# Patient Record
Sex: MALE | HISPANIC OR LATINO | Employment: UNEMPLOYED | URBAN - METROPOLITAN AREA
[De-identification: names, ages, dates, MRNs, and addresses within clinical notes are randomized per-mention and may not be internally consistent; named-entity substitution may affect disease eponyms.]

---

## 2020-05-04 ENCOUNTER — APPOINTMENT (OUTPATIENT)
Dept: GENERAL RADIOLOGY | Facility: HOSPITAL | Age: 65
End: 2020-05-04

## 2020-05-04 ENCOUNTER — HOSPITAL ENCOUNTER (INPATIENT)
Facility: HOSPITAL | Age: 65
LOS: 2 days | Discharge: HOME | End: 2020-05-06
Attending: EMERGENCY MEDICINE | Admitting: INTERNAL MEDICINE

## 2020-05-04 DIAGNOSIS — I20.0 UNSTABLE ANGINA (HCC): Primary | ICD-10-CM

## 2020-05-04 DIAGNOSIS — I24.9 ACUTE CORONARY SYNDROME (HCC): ICD-10-CM

## 2020-05-04 LAB
ALBUMIN SERPL-MCNC: 3.9 G/DL (ref 3.5–5.2)
ALBUMIN/GLOB SERPL: 1.1 G/DL
ALP SERPL-CCNC: 84 U/L (ref 39–117)
ALT SERPL W P-5'-P-CCNC: 28 U/L (ref 1–41)
ANION GAP SERPL CALCULATED.3IONS-SCNC: 13.6 MMOL/L (ref 5–15)
APTT PPP: 28 SECONDS (ref 22.7–35.4)
AST SERPL-CCNC: 29 U/L (ref 1–40)
BASOPHILS # BLD AUTO: 0.04 10*3/MM3 (ref 0–0.2)
BASOPHILS NFR BLD AUTO: 0.5 % (ref 0–1.5)
BILIRUB SERPL-MCNC: 0.3 MG/DL (ref 0.2–1.2)
BUN BLD-MCNC: 15 MG/DL (ref 8–23)
BUN/CREAT SERPL: 16.7 (ref 7–25)
CALCIUM SPEC-SCNC: 8.7 MG/DL (ref 8.6–10.5)
CHLORIDE SERPL-SCNC: 105 MMOL/L (ref 98–107)
CO2 SERPL-SCNC: 21.4 MMOL/L (ref 22–29)
CREAT BLD-MCNC: 0.9 MG/DL (ref 0.76–1.27)
DEPRECATED RDW RBC AUTO: 47.6 FL (ref 37–54)
EOSINOPHIL # BLD AUTO: 0.25 10*3/MM3 (ref 0–0.4)
EOSINOPHIL NFR BLD AUTO: 3.3 % (ref 0.3–6.2)
ERYTHROCYTE [DISTWIDTH] IN BLOOD BY AUTOMATED COUNT: 13.8 % (ref 12.3–15.4)
GFR SERPL CREATININE-BSD FRML MDRD: 103 ML/MIN/1.73
GFR SERPL CREATININE-BSD FRML MDRD: 85 ML/MIN/1.73
GLOBULIN UR ELPH-MCNC: 3.6 GM/DL
GLUCOSE BLD-MCNC: 152 MG/DL (ref 65–99)
HCT VFR BLD AUTO: 42.5 % (ref 37.5–51)
HGB BLD-MCNC: 14.2 G/DL (ref 13–17.7)
IMM GRANULOCYTES # BLD AUTO: 0.02 10*3/MM3 (ref 0–0.05)
IMM GRANULOCYTES NFR BLD AUTO: 0.3 % (ref 0–0.5)
INR PPP: 0.97 (ref 0.9–1.1)
LYMPHOCYTES # BLD AUTO: 2.25 10*3/MM3 (ref 0.7–3.1)
LYMPHOCYTES NFR BLD AUTO: 29.8 % (ref 19.6–45.3)
MCH RBC QN AUTO: 31.1 PG (ref 26.6–33)
MCHC RBC AUTO-ENTMCNC: 33.4 G/DL (ref 31.5–35.7)
MCV RBC AUTO: 93 FL (ref 79–97)
MONOCYTES # BLD AUTO: 0.65 10*3/MM3 (ref 0.1–0.9)
MONOCYTES NFR BLD AUTO: 8.6 % (ref 5–12)
NEUTROPHILS # BLD AUTO: 4.35 10*3/MM3 (ref 1.7–7)
NEUTROPHILS NFR BLD AUTO: 57.5 % (ref 42.7–76)
NRBC BLD AUTO-RTO: 0 /100 WBC (ref 0–0.2)
PLATELET # BLD AUTO: 331 10*3/MM3 (ref 140–450)
PMV BLD AUTO: 9 FL (ref 6–12)
POTASSIUM BLD-SCNC: 3.3 MMOL/L (ref 3.5–5.2)
PROT SERPL-MCNC: 7.5 G/DL (ref 6–8.5)
PROTHROMBIN TIME: 12.6 SECONDS (ref 11.7–14.2)
RBC # BLD AUTO: 4.57 10*6/MM3 (ref 4.14–5.8)
SODIUM BLD-SCNC: 140 MMOL/L (ref 136–145)
TROPONIN T SERPL-MCNC: <0.01 NG/ML (ref 0–0.03)
WBC NRBC COR # BLD: 7.56 10*3/MM3 (ref 3.4–10.8)

## 2020-05-04 PROCEDURE — 99285 EMERGENCY DEPT VISIT HI MDM: CPT

## 2020-05-04 PROCEDURE — 93005 ELECTROCARDIOGRAM TRACING: CPT

## 2020-05-04 PROCEDURE — B2111ZZ FLUOROSCOPY OF MULTIPLE CORONARY ARTERIES USING LOW OSMOLAR CONTRAST: ICD-10-PCS | Performed by: INTERNAL MEDICINE

## 2020-05-04 PROCEDURE — 027034Z DILATION OF CORONARY ARTERY, ONE ARTERY WITH DRUG-ELUTING INTRALUMINAL DEVICE, PERCUTANEOUS APPROACH: ICD-10-PCS | Performed by: INTERNAL MEDICINE

## 2020-05-04 PROCEDURE — 85610 PROTHROMBIN TIME: CPT | Performed by: EMERGENCY MEDICINE

## 2020-05-04 PROCEDURE — 4A023N7 MEASUREMENT OF CARDIAC SAMPLING AND PRESSURE, LEFT HEART, PERCUTANEOUS APPROACH: ICD-10-PCS | Performed by: INTERNAL MEDICINE

## 2020-05-04 PROCEDURE — 99222 1ST HOSP IP/OBS MODERATE 55: CPT | Performed by: INTERNAL MEDICINE

## 2020-05-04 PROCEDURE — 84484 ASSAY OF TROPONIN QUANT: CPT | Performed by: EMERGENCY MEDICINE

## 2020-05-04 PROCEDURE — 85025 COMPLETE CBC W/AUTO DIFF WBC: CPT | Performed by: EMERGENCY MEDICINE

## 2020-05-04 PROCEDURE — 80053 COMPREHEN METABOLIC PANEL: CPT | Performed by: EMERGENCY MEDICINE

## 2020-05-04 PROCEDURE — B2151ZZ FLUOROSCOPY OF LEFT HEART USING LOW OSMOLAR CONTRAST: ICD-10-PCS | Performed by: INTERNAL MEDICINE

## 2020-05-04 PROCEDURE — 25010000002 HEPARIN (PORCINE) PER 1000 UNITS: Performed by: EMERGENCY MEDICINE

## 2020-05-04 PROCEDURE — 93005 ELECTROCARDIOGRAM TRACING: CPT | Performed by: EMERGENCY MEDICINE

## 2020-05-04 PROCEDURE — 93010 ELECTROCARDIOGRAM REPORT: CPT | Performed by: INTERNAL MEDICINE

## 2020-05-04 PROCEDURE — 85730 THROMBOPLASTIN TIME PARTIAL: CPT | Performed by: EMERGENCY MEDICINE

## 2020-05-04 PROCEDURE — 71045 X-RAY EXAM CHEST 1 VIEW: CPT

## 2020-05-04 RX ORDER — ASPIRIN 81 MG/1
81 TABLET, CHEWABLE ORAL DAILY
COMMUNITY

## 2020-05-04 RX ORDER — NITROGLYCERIN 20 MG/100ML
5-200 INJECTION INTRAVENOUS
Status: DISCONTINUED | OUTPATIENT
Start: 2020-05-04 | End: 2020-05-05

## 2020-05-04 RX ORDER — HEPARIN SODIUM 5000 [USP'U]/ML
41.8 INJECTION, SOLUTION INTRAVENOUS; SUBCUTANEOUS ONCE
Status: COMPLETED | OUTPATIENT
Start: 2020-05-04 | End: 2020-05-04

## 2020-05-04 RX ORDER — SODIUM CHLORIDE 0.9 % (FLUSH) 0.9 %
10 SYRINGE (ML) INJECTION AS NEEDED
Status: DISCONTINUED | OUTPATIENT
Start: 2020-05-04 | End: 2020-05-06 | Stop reason: HOSPADM

## 2020-05-04 RX ORDER — HEPARIN SODIUM 10000 [USP'U]/100ML
10.4 INJECTION, SOLUTION INTRAVENOUS
Status: DISCONTINUED | OUTPATIENT
Start: 2020-05-04 | End: 2020-05-05

## 2020-05-04 RX ORDER — HEPARIN SODIUM 5000 [USP'U]/ML
30-41.8 INJECTION, SOLUTION INTRAVENOUS; SUBCUTANEOUS EVERY 6 HOURS PRN
Status: DISCONTINUED | OUTPATIENT
Start: 2020-05-04 | End: 2020-05-05

## 2020-05-04 RX ADMIN — NITROGLYCERIN 10 MCG/MIN: 20 INJECTION INTRAVENOUS at 22:26

## 2020-05-04 RX ADMIN — HEPARIN SODIUM 10.4 UNITS/KG/HR: 10000 INJECTION, SOLUTION INTRAVENOUS at 22:31

## 2020-05-04 RX ADMIN — HEPARIN SODIUM 4000 UNITS: 5000 INJECTION INTRAVENOUS; SUBCUTANEOUS at 22:30

## 2020-05-04 RX ADMIN — METOPROLOL TARTRATE 5 MG: 5 INJECTION INTRAVENOUS at 22:26

## 2020-05-05 LAB
ACT BLD: 191 SECONDS (ref 82–152)
ACT BLD: 252 SECONDS (ref 82–152)
ACT BLD: 301 SECONDS (ref 82–152)
ANION GAP SERPL CALCULATED.3IONS-SCNC: 11.3 MMOL/L (ref 5–15)
APTT PPP: 32.9 SECONDS (ref 22.7–35.4)
BUN BLD-MCNC: 12 MG/DL (ref 8–23)
BUN/CREAT SERPL: 18.2 (ref 7–25)
CALCIUM SPEC-SCNC: 8.5 MG/DL (ref 8.6–10.5)
CHLORIDE SERPL-SCNC: 105 MMOL/L (ref 98–107)
CHOLEST SERPL-MCNC: 170 MG/DL (ref 0–200)
CO2 SERPL-SCNC: 21.7 MMOL/L (ref 22–29)
CREAT BLD-MCNC: 0.66 MG/DL (ref 0.76–1.27)
GFR SERPL CREATININE-BSD FRML MDRD: 121 ML/MIN/1.73
GFR SERPL CREATININE-BSD FRML MDRD: 147 ML/MIN/1.73
GLUCOSE BLD-MCNC: 133 MG/DL (ref 65–99)
GLUCOSE BLDC GLUCOMTR-MCNC: 140 MG/DL (ref 70–130)
GLUCOSE BLDC GLUCOMTR-MCNC: 89 MG/DL (ref 70–130)
HBA1C MFR BLD: 5.9 % (ref 4.8–5.6)
HDLC SERPL-MCNC: 38 MG/DL (ref 40–60)
LDLC SERPL CALC-MCNC: 110 MG/DL (ref 0–100)
LDLC/HDLC SERPL: 2.89 {RATIO}
MAGNESIUM SERPL-MCNC: 2.1 MG/DL (ref 1.6–2.4)
POTASSIUM BLD-SCNC: 3.6 MMOL/L (ref 3.5–5.2)
SODIUM BLD-SCNC: 138 MMOL/L (ref 136–145)
TRIGL SERPL-MCNC: 111 MG/DL (ref 0–150)
TROPONIN T SERPL-MCNC: 0.69 NG/ML (ref 0–0.03)
VLDLC SERPL-MCNC: 22.2 MG/DL (ref 5–40)

## 2020-05-05 PROCEDURE — 0 IOPAMIDOL PER 1 ML: Performed by: INTERNAL MEDICINE

## 2020-05-05 PROCEDURE — C1725 CATH, TRANSLUMIN NON-LASER: HCPCS | Performed by: INTERNAL MEDICINE

## 2020-05-05 PROCEDURE — 25010000002 FENTANYL CITRATE (PF) 100 MCG/2ML SOLUTION: Performed by: INTERNAL MEDICINE

## 2020-05-05 PROCEDURE — 99153 MOD SED SAME PHYS/QHP EA: CPT | Performed by: INTERNAL MEDICINE

## 2020-05-05 PROCEDURE — 99232 SBSQ HOSP IP/OBS MODERATE 35: CPT | Performed by: INTERNAL MEDICINE

## 2020-05-05 PROCEDURE — 83735 ASSAY OF MAGNESIUM: CPT | Performed by: INTERNAL MEDICINE

## 2020-05-05 PROCEDURE — 93458 L HRT ARTERY/VENTRICLE ANGIO: CPT | Performed by: INTERNAL MEDICINE

## 2020-05-05 PROCEDURE — C1769 GUIDE WIRE: HCPCS | Performed by: INTERNAL MEDICINE

## 2020-05-05 PROCEDURE — 25010000002 HEPARIN (PORCINE) PER 1000 UNITS: Performed by: INTERNAL MEDICINE

## 2020-05-05 PROCEDURE — 36415 COLL VENOUS BLD VENIPUNCTURE: CPT | Performed by: INTERNAL MEDICINE

## 2020-05-05 PROCEDURE — 85347 COAGULATION TIME ACTIVATED: CPT

## 2020-05-05 PROCEDURE — 25010000002 MIDAZOLAM PER 1 MG: Performed by: INTERNAL MEDICINE

## 2020-05-05 PROCEDURE — C1894 INTRO/SHEATH, NON-LASER: HCPCS | Performed by: INTERNAL MEDICINE

## 2020-05-05 PROCEDURE — 93010 ELECTROCARDIOGRAM REPORT: CPT | Performed by: INTERNAL MEDICINE

## 2020-05-05 PROCEDURE — 82962 GLUCOSE BLOOD TEST: CPT

## 2020-05-05 PROCEDURE — 84484 ASSAY OF TROPONIN QUANT: CPT | Performed by: INTERNAL MEDICINE

## 2020-05-05 PROCEDURE — 80048 BASIC METABOLIC PNL TOTAL CA: CPT | Performed by: INTERNAL MEDICINE

## 2020-05-05 PROCEDURE — 93005 ELECTROCARDIOGRAM TRACING: CPT | Performed by: INTERNAL MEDICINE

## 2020-05-05 PROCEDURE — 99152 MOD SED SAME PHYS/QHP 5/>YRS: CPT | Performed by: INTERNAL MEDICINE

## 2020-05-05 PROCEDURE — C1874 STENT, COATED/COV W/DEL SYS: HCPCS | Performed by: INTERNAL MEDICINE

## 2020-05-05 PROCEDURE — 85730 THROMBOPLASTIN TIME PARTIAL: CPT | Performed by: INTERNAL MEDICINE

## 2020-05-05 PROCEDURE — 92928 PRQ TCAT PLMT NTRAC ST 1 LES: CPT | Performed by: INTERNAL MEDICINE

## 2020-05-05 PROCEDURE — C9600 PERC DRUG-EL COR STENT SING: HCPCS | Performed by: INTERNAL MEDICINE

## 2020-05-05 PROCEDURE — 80061 LIPID PANEL: CPT | Performed by: INTERNAL MEDICINE

## 2020-05-05 PROCEDURE — 83036 HEMOGLOBIN GLYCOSYLATED A1C: CPT | Performed by: INTERNAL MEDICINE

## 2020-05-05 PROCEDURE — C1887 CATHETER, GUIDING: HCPCS | Performed by: INTERNAL MEDICINE

## 2020-05-05 DEVICE — XIENCE SIERRA™ EVEROLIMUS ELUTING CORONARY STENT SYSTEM 3.50 MM X 18 MM / RAPID-EXCHANGE
Type: IMPLANTABLE DEVICE | Status: FUNCTIONAL
Brand: XIENCE SIERRA™

## 2020-05-05 RX ORDER — AMLODIPINE BESYLATE 5 MG/1
5 TABLET ORAL ONCE
Status: COMPLETED | OUTPATIENT
Start: 2020-05-05 | End: 2020-05-05

## 2020-05-05 RX ORDER — HYDROCODONE BITARTRATE AND ACETAMINOPHEN 5; 325 MG/1; MG/1
1 TABLET ORAL EVERY 4 HOURS PRN
Status: DISCONTINUED | OUTPATIENT
Start: 2020-05-05 | End: 2020-05-06 | Stop reason: HOSPADM

## 2020-05-05 RX ORDER — LIDOCAINE HYDROCHLORIDE 20 MG/ML
INJECTION, SOLUTION INFILTRATION; PERINEURAL AS NEEDED
Status: DISCONTINUED | OUTPATIENT
Start: 2020-05-05 | End: 2020-05-05 | Stop reason: HOSPADM

## 2020-05-05 RX ORDER — SODIUM CHLORIDE 9 MG/ML
100 INJECTION, SOLUTION INTRAVENOUS CONTINUOUS
Status: DISCONTINUED | OUTPATIENT
Start: 2020-05-05 | End: 2020-05-05

## 2020-05-05 RX ORDER — POTASSIUM CHLORIDE 1.5 G/1.77G
40 POWDER, FOR SOLUTION ORAL AS NEEDED
Status: DISCONTINUED | OUTPATIENT
Start: 2020-05-05 | End: 2020-05-06 | Stop reason: HOSPADM

## 2020-05-05 RX ORDER — CARVEDILOL 3.12 MG/1
3.12 TABLET ORAL ONCE
Status: COMPLETED | OUTPATIENT
Start: 2020-05-05 | End: 2020-05-05

## 2020-05-05 RX ORDER — NALOXONE HCL 0.4 MG/ML
0.4 VIAL (ML) INJECTION
Status: DISCONTINUED | OUTPATIENT
Start: 2020-05-05 | End: 2020-05-06 | Stop reason: HOSPADM

## 2020-05-05 RX ORDER — LISINOPRIL 20 MG/1
20 TABLET ORAL ONCE
Status: COMPLETED | OUTPATIENT
Start: 2020-05-05 | End: 2020-05-05

## 2020-05-05 RX ORDER — SODIUM CHLORIDE 0.9 % (FLUSH) 0.9 %
10 SYRINGE (ML) INJECTION AS NEEDED
Status: DISCONTINUED | OUTPATIENT
Start: 2020-05-05 | End: 2020-05-06 | Stop reason: HOSPADM

## 2020-05-05 RX ORDER — SODIUM CHLORIDE 0.9 % (FLUSH) 0.9 %
10 SYRINGE (ML) INJECTION EVERY 12 HOURS SCHEDULED
Status: DISCONTINUED | OUTPATIENT
Start: 2020-05-05 | End: 2020-05-06 | Stop reason: HOSPADM

## 2020-05-05 RX ORDER — HYDRALAZINE HYDROCHLORIDE 20 MG/ML
10 INJECTION INTRAMUSCULAR; INTRAVENOUS EVERY 4 HOURS PRN
Status: DISCONTINUED | OUTPATIENT
Start: 2020-05-05 | End: 2020-05-06 | Stop reason: HOSPADM

## 2020-05-05 RX ORDER — CARVEDILOL 6.25 MG/1
6.25 TABLET ORAL 2 TIMES DAILY WITH MEALS
Status: DISCONTINUED | OUTPATIENT
Start: 2020-05-05 | End: 2020-05-06

## 2020-05-05 RX ORDER — HEPARIN SODIUM 1000 [USP'U]/ML
INJECTION, SOLUTION INTRAVENOUS; SUBCUTANEOUS AS NEEDED
Status: DISCONTINUED | OUTPATIENT
Start: 2020-05-05 | End: 2020-05-05 | Stop reason: HOSPADM

## 2020-05-05 RX ORDER — CARVEDILOL 3.12 MG/1
3.12 TABLET ORAL 2 TIMES DAILY WITH MEALS
Status: DISCONTINUED | OUTPATIENT
Start: 2020-05-05 | End: 2020-05-05

## 2020-05-05 RX ORDER — ATORVASTATIN CALCIUM 80 MG/1
80 TABLET, FILM COATED ORAL NIGHTLY
Status: DISCONTINUED | OUTPATIENT
Start: 2020-05-05 | End: 2020-05-06 | Stop reason: HOSPADM

## 2020-05-05 RX ORDER — ONDANSETRON 2 MG/ML
4 INJECTION INTRAMUSCULAR; INTRAVENOUS EVERY 6 HOURS PRN
Status: DISCONTINUED | OUTPATIENT
Start: 2020-05-05 | End: 2020-05-06 | Stop reason: HOSPADM

## 2020-05-05 RX ORDER — SODIUM CHLORIDE 9 MG/ML
100 INJECTION, SOLUTION INTRAVENOUS CONTINUOUS
Status: ACTIVE | OUTPATIENT
Start: 2020-05-05 | End: 2020-05-05

## 2020-05-05 RX ORDER — FENTANYL CITRATE 50 UG/ML
INJECTION, SOLUTION INTRAMUSCULAR; INTRAVENOUS AS NEEDED
Status: DISCONTINUED | OUTPATIENT
Start: 2020-05-05 | End: 2020-05-05 | Stop reason: HOSPADM

## 2020-05-05 RX ORDER — MORPHINE SULFATE 2 MG/ML
2 INJECTION, SOLUTION INTRAMUSCULAR; INTRAVENOUS EVERY 4 HOURS PRN
Status: DISCONTINUED | OUTPATIENT
Start: 2020-05-05 | End: 2020-05-06 | Stop reason: HOSPADM

## 2020-05-05 RX ORDER — LISINOPRIL 5 MG/1
5 TABLET ORAL
Status: DISCONTINUED | OUTPATIENT
Start: 2020-05-05 | End: 2020-05-06

## 2020-05-05 RX ORDER — CLOPIDOGREL BISULFATE 75 MG/1
75 TABLET ORAL DAILY
Status: DISCONTINUED | OUTPATIENT
Start: 2020-05-06 | End: 2020-05-06 | Stop reason: HOSPADM

## 2020-05-05 RX ORDER — POTASSIUM CHLORIDE 7.45 MG/ML
10 INJECTION INTRAVENOUS
Status: DISCONTINUED | OUTPATIENT
Start: 2020-05-05 | End: 2020-05-06 | Stop reason: HOSPADM

## 2020-05-05 RX ORDER — ASPIRIN 81 MG/1
81 TABLET, CHEWABLE ORAL DAILY
Status: DISCONTINUED | OUTPATIENT
Start: 2020-05-05 | End: 2020-05-06 | Stop reason: HOSPADM

## 2020-05-05 RX ORDER — POTASSIUM CHLORIDE 750 MG/1
40 CAPSULE, EXTENDED RELEASE ORAL AS NEEDED
Status: DISCONTINUED | OUTPATIENT
Start: 2020-05-05 | End: 2020-05-06 | Stop reason: HOSPADM

## 2020-05-05 RX ORDER — CARVEDILOL 6.25 MG/1
6.25 TABLET ORAL 2 TIMES DAILY WITH MEALS
Status: DISCONTINUED | OUTPATIENT
Start: 2020-05-05 | End: 2020-05-05

## 2020-05-05 RX ORDER — ACETAMINOPHEN 325 MG/1
650 TABLET ORAL EVERY 4 HOURS PRN
Status: DISCONTINUED | OUTPATIENT
Start: 2020-05-05 | End: 2020-05-06 | Stop reason: HOSPADM

## 2020-05-05 RX ORDER — MIDAZOLAM HYDROCHLORIDE 1 MG/ML
INJECTION INTRAMUSCULAR; INTRAVENOUS AS NEEDED
Status: DISCONTINUED | OUTPATIENT
Start: 2020-05-05 | End: 2020-05-05 | Stop reason: HOSPADM

## 2020-05-05 RX ORDER — ONDANSETRON 4 MG/1
4 TABLET, FILM COATED ORAL EVERY 6 HOURS PRN
Status: DISCONTINUED | OUTPATIENT
Start: 2020-05-05 | End: 2020-05-06 | Stop reason: HOSPADM

## 2020-05-05 RX ADMIN — LISINOPRIL 20 MG: 20 TABLET ORAL at 21:53

## 2020-05-05 RX ADMIN — POTASSIUM CHLORIDE 40 MEQ: 10 CAPSULE, COATED, EXTENDED RELEASE ORAL at 12:22

## 2020-05-05 RX ADMIN — AMLODIPINE BESYLATE 5 MG: 5 TABLET ORAL at 21:53

## 2020-05-05 RX ADMIN — CARVEDILOL 3.12 MG: 6.25 TABLET, FILM COATED ORAL at 12:20

## 2020-05-05 RX ADMIN — SODIUM CHLORIDE, PRESERVATIVE FREE 10 ML: 5 INJECTION INTRAVENOUS at 21:54

## 2020-05-05 RX ADMIN — POTASSIUM CHLORIDE 40 MEQ: 1.5 POWDER, FOR SOLUTION ORAL at 01:56

## 2020-05-05 RX ADMIN — SODIUM CHLORIDE, PRESERVATIVE FREE 10 ML: 5 INJECTION INTRAVENOUS at 01:56

## 2020-05-05 RX ADMIN — HEPARIN SODIUM 4000 UNITS: 5000 INJECTION INTRAVENOUS; SUBCUTANEOUS at 08:11

## 2020-05-05 RX ADMIN — SODIUM CHLORIDE 100 ML/HR: 9 INJECTION, SOLUTION INTRAVENOUS at 08:05

## 2020-05-05 RX ADMIN — HYDROCODONE BITARTRATE AND ACETAMINOPHEN 1 TABLET: 5; 325 TABLET ORAL at 01:44

## 2020-05-05 RX ADMIN — CARVEDILOL 6.25 MG: 6.25 TABLET, FILM COATED ORAL at 01:44

## 2020-05-05 RX ADMIN — SODIUM CHLORIDE, PRESERVATIVE FREE 10 ML: 5 INJECTION INTRAVENOUS at 08:06

## 2020-05-05 RX ADMIN — ASPIRIN 81 MG: 81 TABLET, CHEWABLE ORAL at 08:06

## 2020-05-05 RX ADMIN — POTASSIUM CHLORIDE 40 MEQ: 10 CAPSULE, COATED, EXTENDED RELEASE ORAL at 08:06

## 2020-05-05 RX ADMIN — LISINOPRIL 5 MG: 5 TABLET ORAL at 16:15

## 2020-05-05 RX ADMIN — ATORVASTATIN CALCIUM 80 MG: 80 TABLET, FILM COATED ORAL at 21:53

## 2020-05-05 RX ADMIN — CARVEDILOL 3.12 MG: 3.12 TABLET, FILM COATED ORAL at 16:15

## 2020-05-05 RX ADMIN — ATORVASTATIN CALCIUM 80 MG: 80 TABLET, FILM COATED ORAL at 01:44

## 2020-05-05 RX ADMIN — CARVEDILOL 6.25 MG: 6.25 TABLET, FILM COATED ORAL at 17:58

## 2020-05-05 NOTE — PROGRESS NOTES
Discharge Planning Assessment  [facility]     Patient Name: Lang Huerta  MRN: [id number]  Today's Date: 5/5/2020    Admit Date: 5/4/2020    Discharge Needs Assessment     Row Name 05/05/20 1437       Living Environment    Lives With  alone    Current Living Arrangements  home/apartment/condo    Primary Care Provided by  self    Provides Primary Care For  no one    Family Caregiver if Needed  none       Transition Planning    Patient/Family Anticipates Transition to  home    Transportation Anticipated  family or friend will provide       Discharge Needs Assessment    Concerns to be Addressed  denies needs/concerns at this time    Equipment Currently Used at Home  none    Equipment Needed After Discharge  none        Discharge Plan     Row Name 05/05/20 1439       Plan    Plan  Home    Provided Post Acute Provider List?  N/A    N/A Provider List Comment  declines rehab needs    Provided Post Acute Provider Quality & Resource List?  N/A    N/A Quality & Resource List Comment  declines offered information    Patient/Family in Agreement with Plan  yes patient    Plan Comments  Met with patient at bedside and used [place] video  call service for screening.  Verfied facesheet information and updated.  However, he states his PCP is Dr Jensen and unable to verify at what clinic or location other than the 3rd floor of a building.  He states he has only seen this doctor once or twice.  He would prefer to use [facility] for beds.  He states he only takes a daily aspirin and a Centrum vitamin.  He is Medicaid pending and CCP will need to assist with DC medications.  He has  no DME or rehab history.  He is IADLs and lives alone.  He drives self.  He plans home and agrees with plan for CCP to follow and assist with DC needs that may arise.  His emergency contact is Angie Cerda, friend, [phone]. ...............................Noa Love RN        Destination      Coordination has not been started  for this encounter.      Durable Medical Equipment      Coordination has not been started for this encounter.      Dialysis/Infusion      Coordination has not been started for this encounter.      Home Medical Care      Coordination has not been started for this encounter.      Therapy      Coordination has not been started for this encounter.      Community Resources      Coordination has not been started for this encounter.          Demographic Summary     Row Name 05/05/20 1433       General Information    Admission Type  inpatient    Arrived From  home    Referral Source  admission list    Preferred Language  Syrian     Used During This Interaction  yes    General Information Comments  video call       Contact Information    Permission Granted to Share Info With  ;family/designee    Contact Information Comments  Angie Cerda, friend, [phone]        Functional Status     Row Name 05/05/20 1435       Functional Status    Usual Activity Tolerance  good    Current Activity Tolerance  fair       Functional Status, IADL    Medications  independent    Meal Preparation  independent    Housekeeping  independent    Laundry  independent    Shopping  independent       Mental Status    General Appearance WDL  WDL       Mental Status Summary    Recent Changes in Mental Status/Cognitive Functioning  no changes       Employment/    Employment Status  employed full time        Psychosocial    No documentation.       Abuse/Neglect    No documentation.       Legal    No documentation.       Substance Abuse    No documentation.       Patient Forms    No documentation.           Noa Love RN

## 2020-05-05 NOTE — ACP (ADVANCE CARE PLANNING)
Educated pt on AD    Informed pt they could file AD in hospital by requesting a new consult be placed, at which a notary would be present for completion of AD

## 2020-05-05 NOTE — ED NOTES
Pt was brought in by ems for chest pain. Pt started to have chest pain this evening. Reports diaphoresis. Denies soa. Pt was given 1sl nitro and 325mg asa by ems. Pt had similar episode last week     Alissa Hernandez, RN  05/04/20 7035

## 2020-05-05 NOTE — CONSULTS
Information about cardiac rehab provided along with contact information and exercise guidelines. Program is temporary closed due to Covid-19 will call patient when program reopens.

## 2020-05-05 NOTE — PROGRESS NOTES
[facility] Follow Up    Chief Complaint: Stuttering anterior myocardial infarction    Interval History:  Initially reported mild chest pain overnight but then stated he was pain free.  No dyspnea.      Objective:     Objective:  Temp:  [96.8 °F (36 °C)-97.5 °F (36.4 °C)] 97.5 °F (36.4 °C)  Heart Rate:  [64-91] 64  Resp:  [16-22] 16  BP: (125-160)/() 125/87     Intake/Output Summary (Last 24 hours) at 5/5/2020 0731  Last data filed at 5/5/2020 0600  Gross per 24 hour   Intake 119.66 ml   Output --   Net 119.66 ml     Body mass index is 31.48 kg/m².      05/04/20 2223 05/05/20  0028   Weight: 95.7 kg (211 lb) 93.9 kg (207 lb 0.2 oz)     Weight change:       Physical Exam:   General : Alert, cooperative, in no acute distress.  Neuro: Alert,cooperative and oriented.  Lungs: CTAB. Normal respiratory effort and rate.  CV: Regular rate and rhythm, normal S1 and S2, no murmurs, gallops or rubs.  ABD: Soft, nontender, nondistended. Positive bowel sounds.  Extr: No edema or cyanosis, moves all extremities.    Lab Review:   Results from last 7 days   Lab Units 05/05/20 0521 05/04/20 2233   SODIUM mmol/L 138 140   POTASSIUM mmol/L 3.6 3.3*   CHLORIDE mmol/L 105 105   CO2 mmol/L 21.7* 21.4*   BUN mg/dL 12 15   CREATININE mg/dL 0.66* 0.90   GLUCOSE mg/dL 133* 152*   CALCIUM mg/dL 8.5* 8.7   AST (SGOT) U/L  --  29   ALT (SGPT) U/L  --  28     Results from last 7 days   Lab Units 05/05/20  0203 05/04/20 2233   TROPONIN T ng/mL 0.693* <0.010     Results from last 7 days   Lab Units 05/04/20 2233   WBC 10*3/mm3 7.56   HEMOGLOBIN g/dL 14.2   HEMATOCRIT % 42.5   PLATELETS 10*3/mm3 331     Results from last 7 days   Lab Units 05/05/20  0521 05/04/20  2233   INR   --  0.97   APTT seconds 32.9 28.0         Results from last 7 days   Lab Units 05/05/20  0521   CHOLESTEROL mg/dL 170   TRIGLYCERIDES mg/dL 111   HDL CHOL mg/dL 38*             I reviewed the patient's new clinical results.  I personally viewed  and interpreted the patient's EKG  Current Medications:   Scheduled Meds:  aspirin 81 mg Oral Daily   atorvastatin 80 mg Oral Nightly   carvedilol 6.25 mg Oral BID With Meals   sodium chloride 10 mL Intravenous Q12H     Continuous Infusions:  heparin (porcine) 10.4 Units/kg/hr Last Rate: 10.4 Units/kg/hr (05/05/20 0600)   nitroglycerin 5-200 mcg/min Last Rate: 30 mcg/min (05/05/20 0600)       Allergies:  No Known Allergies    Assessment:     1. Stuttering anterior myocardial infarction.  No recurrent EKG changes.  Troponin trended up as expected.  Possible recurrent mild pain overnight.   2. Hypertension.  Better controlled with carvedilol and nitroglycerin gtt.   3. Dyslipidemia  4. Hypokalemia.  Improved.     -  Proceed with cardiac catheterization today        Tita Mcpherson MD  05/05/20  07:31

## 2020-05-05 NOTE — ED PROVIDER NOTES
EMERGENCY DEPARTMENT ENCOUNTER    Room Number:  15/15  Date of encounter:  5/4/2020  PCP: Provider, No Known  Historian: Patient and EMS      HPI:  Chief Complaint: Chest pain  A complete HPI/ROS/PMH/PSH/SH/FH are unobtainable due to: Nothing.   was used    Context: Lang Huerta is a 64 y.o. male who presents to the ED c/o substernal chest pain beginning earlier this evening.  The pain began after dinner, radiated to the left arm, associated with diaphoresis shortness of breath.  The pain was described as severe, EMS reports that when they picked him up they gave him a nitro and the pain got better.  They did not transmit an EKG to us in route, I interpreted it contemporaneously and it did show ST elevations in V1 and aVL along with some inferolateral ST depressions.  For that reason Dr. Mcpherson was called and the Cath Lab was activated.    Patient has no known cardiac history, takes no medications, smokes cigarettes, and no obvious family history for heart disease.      PAST MEDICAL HISTORY  Active Ambulatory Problems     Diagnosis Date Noted   • No Active Ambulatory Problems     Resolved Ambulatory Problems     Diagnosis Date Noted   • No Resolved Ambulatory Problems     No Additional Past Medical History         PAST SURGICAL HISTORY  Past Surgical History:   Procedure Laterality Date   • THYROID SURGERY           FAMILY HISTORY  History reviewed. No pertinent family history.      SOCIAL HISTORY  Social History     Socioeconomic History   • Marital status: Single     Spouse name: Not on file   • Number of children: Not on file   • Years of education: Not on file   • Highest education level: Not on file   Tobacco Use   • Smoking status: Current Some Day Smoker   • Smokeless tobacco: Never Used   Substance and Sexual Activity   • Alcohol use: Never     Frequency: Never   • Drug use: Never         ALLERGIES  Patient has no known allergies.        REVIEW OF SYSTEMS  Review of Systems      All systems reviewed and negative except for those discussed in HPI.       PHYSICAL EXAM    I have reviewed the triage vital signs and nursing notes.    ED Triage Vitals [05/04/20 2218]   Temp Heart Rate Resp BP SpO2   96.8 °F (36 °C) 84 22 (!) 154/110 96 %      Temp src Heart Rate Source Patient Position BP Location FiO2 (%)   Tympanic Monitor -- -- --       Physical Exam  GENERAL: Awake and alert, mild distress  HENT: nares patent  EYES: no scleral icterus  CV: regular rhythm, regular rate  RESPIRATORY: normal effort, CTA bilaterally  ABDOMEN: soft, nontender palpation, bowel sounds present  MUSCULOSKELETAL: no deformity  NEURO: alert, moves all extremities, follows commands  SKIN: Diaphoretic        LAB RESULTS  Recent Results (from the past 24 hour(s))   CBC Auto Differential    Collection Time: 05/04/20 10:33 PM   Result Value Ref Range    WBC 7.56 3.40 - 10.80 10*3/mm3    RBC 4.57 4.14 - 5.80 10*6/mm3    Hemoglobin 14.2 13.0 - 17.7 g/dL    Hematocrit 42.5 37.5 - 51.0 %    MCV 93.0 79.0 - 97.0 fL    MCH 31.1 26.6 - 33.0 pg    MCHC 33.4 31.5 - 35.7 g/dL    RDW 13.8 12.3 - 15.4 %    RDW-SD 47.6 37.0 - 54.0 fl    MPV 9.0 6.0 - 12.0 fL    Platelets 331 140 - 450 10*3/mm3    Neutrophil % 57.5 42.7 - 76.0 %    Lymphocyte % 29.8 19.6 - 45.3 %    Monocyte % 8.6 5.0 - 12.0 %    Eosinophil % 3.3 0.3 - 6.2 %    Basophil % 0.5 0.0 - 1.5 %    Immature Grans % 0.3 0.0 - 0.5 %    Neutrophils, Absolute 4.35 1.70 - 7.00 10*3/mm3    Lymphocytes, Absolute 2.25 0.70 - 3.10 10*3/mm3    Monocytes, Absolute 0.65 0.10 - 0.90 10*3/mm3    Eosinophils, Absolute 0.25 0.00 - 0.40 10*3/mm3    Basophils, Absolute 0.04 0.00 - 0.20 10*3/mm3    Immature Grans, Absolute 0.02 0.00 - 0.05 10*3/mm3    nRBC 0.0 0.0 - 0.2 /100 WBC   Protime-INR    Collection Time: 05/04/20 10:33 PM   Result Value Ref Range    Protime 12.6 11.7 - 14.2 Seconds    INR 0.97 0.90 - 1.10   aPTT    Collection Time: 05/04/20 10:33 PM   Result Value Ref Range    PTT 28.0  22.7 - 35.4 seconds       Ordered the above labs and independently reviewed the results.        RADIOLOGY  Xr Chest 1 View    Result Date: 5/4/2020  PORTABLE CHEST RADIOGRAPH  HISTORY: Chest pain  COMPARISON: None available.  FINDINGS: Cardiomegaly is identified. There is no vascular congestion. No pneumothorax, pleural effusion, or acute infiltrate is seen.      No acute findings.  This report was finalized on 5/4/2020 10:49 PM by Dr. Leslie Boone M.D.        I ordered the above noted radiological studies. Reviewed by me and discussed with radiologist.  See dictation for official radiology interpretation.      PROCEDURES    Critical Care  Performed by: Tera Baer MD  Authorized by: Tera Baer MD     Critical care provider statement:     Critical care time (minutes):  35    Critical care time was exclusive of:  Separately billable procedures and treating other patients    Critical care was necessary to treat or prevent imminent or life-threatening deterioration of the following conditions:  Cardiac failure (Acute coronary syndrome)    Critical care was time spent personally by me on the following activities:  Discussions with consultants, evaluation of patient's response to treatment, examination of patient, ordering and performing treatments and interventions, ordering and review of laboratory studies, ordering and review of radiographic studies, pulse oximetry and re-evaluation of patient's condition    I assumed direction of critical care for this patient from another provider in my specialty: no            MEDICATIONS GIVEN IN ER    Medications   sodium chloride 0.9 % flush 10 mL (has no administration in time range)   nitroglycerin 50 mg/250 mL (0.2 mg/mL) infusion (10 mcg/min Intravenous New Bag 5/4/20 4847)   heparin 25963 units/250 mL (100 units/mL) in 0.45 % NaCl infusion (10.4 Units/kg/hr × 95.7 kg Intravenous New Bag 5/4/20 2231)   heparin (porcine) 5000 UNIT/ML injection 2,900-4,000  Units (has no administration in time range)   metoprolol tartrate (LOPRESSOR) injection 5 mg (5 mg Intravenous Given 5/4/20 2226)   heparin (porcine) 5000 UNIT/ML injection 4,000 Units (4,000 Units Intravenous Given 5/4/20 2230)         PROGRESS, DATA ANALYSIS, CONSULTS, AND MEDICAL DECISION MAKING    All labs have been independently reviewed by me.  All radiology studies have been reviewed by me and discussed with radiologist dictating the report.   EKG's independently viewed and interpreted by me.  Discussion below represents my analysis of pertinent findings related to patient's condition, differential diagnosis, treatment plan and final disposition.        AS OF 23:17 VITALS:    BP - 135/98  HR - 71  TEMP - 96.8 °F (36 °C) (Tympanic)  O2 SATS - 93%        DIAGNOSIS  Final diagnoses:   Unstable angina (CMS/[facility])         DISPOSITION  Admit to CCU           Tera Baer MD  05/04/20 2621       Tera Baer MD  05/04/20 8191       Tera Baer MD  05/04/20 4423

## 2020-05-05 NOTE — H&P
[facility] History And Physical Assessment    Patient Name: Lang Sin  Age/Sex: 64 y.o. male  : 1955  MRN: [id number]    Date of Hospital Admission: 2020  Date of Encounter Visit: 20  Encounter Provider: Tita Mcpherson MD  [facility] of Service[facility]  Patient Care Team:  Provider, No Known as PCP - General    Subjective:     Chief Complaint:  Chest pain    History of Present Illness:  Lang Sin is a 64 y.o. male no known past medical history who presented to the emergency room with chest pain and suspected anterior myocardial infarction.    The patient is primarily Ivorian-speaking and history was obtained with the assistance of an ER RN who was able to provide translation.  the patient reports that he had his first episode of pain about 4 weeks ago he describes as pressure associated with diaphoresis and shortness of breath.  The first episode was mild but then he had he had another episode about a week ago that was more severe.  Tonight he had another episode that was even more severe prompting him to call EMS.  EKG performed in the field showed evidence of mild ST elevation with J-point elevation in lead V1, V2, and aVL and ST depression of the inferolateral leads.  He was given sublingual nitroglycerin by EMS with resolution in his symptoms.  Since his arrival to the emergency room he has had 2 serial EKGs which have shown normalization of his ST changes.  He remains chest pain-free.  His blood pressures are markedly elevated.  He denies any fevers, chills, or recent cough.    He denies any prior cardiac history.  He does not see a doctor on a regular basis.  He is unaware of his blood pressures.  He does not take any medications at home except for an aspirin 81 mg daily.  He reports until about a month ago he was exercising and running on a regular basis.  Since the COVID-19 pandemic he has not been exercising because he is  afraid to go out.  He denies any regular tobacco use.  He denies any known family history of cardiac disease.    Past Medical History:  History reviewed. No pertinent past medical history.    Past Surgical History:   Procedure Laterality Date   • THYROID SURGERY         Home Medications:   1.  Aspirin 81 mg    Allergies:  No Known Allergies    Past Social History:  Social History     Socioeconomic History   • Marital status: Single     Spouse name: Not on file   • Number of children: Not on file   • Years of education: Not on file   • Highest education level: Not on file   Tobacco Use   • Smoking status: Current Some Day Smoker   • Smokeless tobacco: Never Used   Substance and Sexual Activity   • Alcohol use: Never     Frequency: Never   • Drug use: Never       Past Family History:  History reviewed. No pertinent family history.    Review of Systems:   All systems reviewed. Pertinent positives identified in HPI. All other systems are negative.    Objective:   Temp:  [96.8 °F (36 °C)] 96.8 °F (36 °C)  Heart Rate:  [80-91] 89  Resp:  [20-22] 20  BP: (153-160)/() 160/99  No intake or output data in the 24 hours ending 05/04/20 2244  Body mass index is 32.08 kg/m².      05/04/20  2223   Weight: 95.7 kg (211 lb)     Weight change:     Physical Exam:   General Appearance:    Alert, cooperative, in no acute distress   Head:    Normocephalic, without obvious abnormality, atraumatic   Eyes:            Lids and lashes normal, conjunctivae and sclerae normal, no   icterus, no pallor, corneas clear, PERRLA   Ears:    Ears appear intact with no abnormalities noted   Neck:   No adenopathy, supple, trachea midline, no thyromegaly, no   carotid bruit, no JVD   Lungs:     Clear to auscultation,respirations regular, even and unlabored    Heart:    Regular rhythm and normal rate, normal S1 and S2, no murmur, no gallop, no rub, no click   Chest Wall:    No abnormalities observed   Abdomen:     Normal bowel sounds, no masses, no  organomegaly, soft        non-tender, non-distended, no guarding, no rebound  tenderness   Extremities:   Moves all extremities well, no edema, no cyanosis, no redness   Pulses:   Pulses palpable and equal bilaterally. Normal radial, carotid, femoral, dorsalis pedis and posterior tibial pulses bilaterally. Normal abdominal aorta   Skin:  Psychiatric:   No bleeding, bruising or rash    Alert and oriented x 3, normal mood and affect         Lab Review:   Pending      Imaging:  Pending    I personally viewed and interpreted the patient's EKG    Assessment/Plan:     1.  Stuttering angina.  Associated with EKG significant EKG changes.  His EKG changes prior to his arrival are concerning for a possible anteroseptal myocardial infarction however these have completely resolved with treatment.  He now appears comfortable with a normal EKG.  2.  Hypertension.  Markedly elevated blood pressures.    -  Since he is current pain free with a normal EKG will try to manage medically overnight and plan for cardiac catheterization in the morning as long as he remains stable.  If he becomes unstable overnight we will certainly proceed with cardiac catheterization earlier.  I discussed the procedure with the patient and he agrees to proceed.  -  Continue nitroglycerin drip  -  Continue heparin drip  -  Continue aspirin  -  Start beta blockers  -  Start statin    Tita Mcpherson MD  05/04/20  22:44

## 2020-05-05 NOTE — PLAN OF CARE
Pt has had progressive chest pain with activity that has been worsening for the past month.  Last night the CP started and was the worst it had been. To the ER where he showed signs of an anterior MI. Nitro and Hep drip started and sent to the ICU. To cath lab this AM, Stent to the Prox LAD.  R Radial approach, sight clean dry soft. Pt stable with OF orders.

## 2020-05-06 VITALS
BODY MASS INDEX: 31.37 KG/M2 | SYSTOLIC BLOOD PRESSURE: 117 MMHG | HEART RATE: 80 BPM | RESPIRATION RATE: 20 BRPM | HEIGHT: 68 IN | TEMPERATURE: 98.1 F | OXYGEN SATURATION: 94 % | DIASTOLIC BLOOD PRESSURE: 87 MMHG | WEIGHT: 207.01 LBS

## 2020-05-06 LAB
ANION GAP SERPL CALCULATED.3IONS-SCNC: 11.8 MMOL/L (ref 5–15)
BUN BLD-MCNC: 14 MG/DL (ref 8–23)
BUN/CREAT SERPL: 15.6 (ref 7–25)
CALCIUM SPEC-SCNC: 8.6 MG/DL (ref 8.6–10.5)
CHLORIDE SERPL-SCNC: 107 MMOL/L (ref 98–107)
CHOLEST SERPL-MCNC: 157 MG/DL (ref 0–200)
CO2 SERPL-SCNC: 21.2 MMOL/L (ref 22–29)
CREAT BLD-MCNC: 0.9 MG/DL (ref 0.76–1.27)
DEPRECATED RDW RBC AUTO: 43.1 FL (ref 37–54)
ERYTHROCYTE [DISTWIDTH] IN BLOOD BY AUTOMATED COUNT: 13.2 % (ref 12.3–15.4)
GFR SERPL CREATININE-BSD FRML MDRD: 103 ML/MIN/1.73
GFR SERPL CREATININE-BSD FRML MDRD: 85 ML/MIN/1.73
GLUCOSE BLD-MCNC: 98 MG/DL (ref 65–99)
HCT VFR BLD AUTO: 40.7 % (ref 37.5–51)
HDLC SERPL-MCNC: 37 MG/DL (ref 40–60)
HGB BLD-MCNC: 14.2 G/DL (ref 13–17.7)
LDLC SERPL CALC-MCNC: 104 MG/DL (ref 0–100)
LDLC/HDLC SERPL: 2.81 {RATIO}
MCH RBC QN AUTO: 31.3 PG (ref 26.6–33)
MCHC RBC AUTO-ENTMCNC: 34.9 G/DL (ref 31.5–35.7)
MCV RBC AUTO: 89.6 FL (ref 79–97)
PLATELET # BLD AUTO: 331 10*3/MM3 (ref 140–450)
PMV BLD AUTO: 9.1 FL (ref 6–12)
POTASSIUM BLD-SCNC: 3.9 MMOL/L (ref 3.5–5.2)
RBC # BLD AUTO: 4.54 10*6/MM3 (ref 4.14–5.8)
SODIUM BLD-SCNC: 140 MMOL/L (ref 136–145)
TRIGL SERPL-MCNC: 80 MG/DL (ref 0–150)
VLDLC SERPL-MCNC: 16 MG/DL (ref 5–40)
WBC NRBC COR # BLD: 9.82 10*3/MM3 (ref 3.4–10.8)

## 2020-05-06 PROCEDURE — 93005 ELECTROCARDIOGRAM TRACING: CPT | Performed by: INTERNAL MEDICINE

## 2020-05-06 PROCEDURE — 80048 BASIC METABOLIC PNL TOTAL CA: CPT | Performed by: INTERNAL MEDICINE

## 2020-05-06 PROCEDURE — 80061 LIPID PANEL: CPT | Performed by: INTERNAL MEDICINE

## 2020-05-06 PROCEDURE — 93010 ELECTROCARDIOGRAM REPORT: CPT | Performed by: INTERNAL MEDICINE

## 2020-05-06 PROCEDURE — 25010000002 HYDRALAZINE PER 20 MG: Performed by: INTERNAL MEDICINE

## 2020-05-06 PROCEDURE — 85027 COMPLETE CBC AUTOMATED: CPT | Performed by: INTERNAL MEDICINE

## 2020-05-06 PROCEDURE — 99239 HOSP IP/OBS DSCHRG MGMT >30: CPT | Performed by: INTERNAL MEDICINE

## 2020-05-06 RX ORDER — CLOPIDOGREL BISULFATE 75 MG/1
75 TABLET ORAL DAILY
Qty: 30 TABLET | Refills: 11 | Status: SHIPPED | OUTPATIENT
Start: 2020-05-07 | End: 2020-05-13

## 2020-05-06 RX ORDER — ATORVASTATIN CALCIUM 80 MG/1
80 TABLET, FILM COATED ORAL NIGHTLY
Qty: 30 TABLET | Refills: 5 | Status: SHIPPED | OUTPATIENT
Start: 2020-05-06 | End: 2020-05-13

## 2020-05-06 RX ORDER — CARVEDILOL 12.5 MG/1
12.5 TABLET ORAL 2 TIMES DAILY WITH MEALS
Status: DISCONTINUED | OUTPATIENT
Start: 2020-05-06 | End: 2020-05-06 | Stop reason: HOSPADM

## 2020-05-06 RX ORDER — CARVEDILOL 12.5 MG/1
12.5 TABLET ORAL 2 TIMES DAILY WITH MEALS
Qty: 30 TABLET | Refills: 5 | Status: SHIPPED | OUTPATIENT
Start: 2020-05-06 | End: 2020-05-13

## 2020-05-06 RX ORDER — LISINOPRIL 10 MG/1
10 TABLET ORAL
Qty: 30 TABLET | Refills: 5 | Status: SHIPPED | OUTPATIENT
Start: 2020-05-07 | End: 2020-05-13

## 2020-05-06 RX ORDER — LISINOPRIL 10 MG/1
10 TABLET ORAL
Status: DISCONTINUED | OUTPATIENT
Start: 2020-05-07 | End: 2020-05-06 | Stop reason: HOSPADM

## 2020-05-06 RX ORDER — CARVEDILOL 6.25 MG/1
6.25 TABLET ORAL ONCE
Status: COMPLETED | OUTPATIENT
Start: 2020-05-06 | End: 2020-05-06

## 2020-05-06 RX ADMIN — CLOPIDOGREL 75 MG: 75 TABLET, FILM COATED ORAL at 09:00

## 2020-05-06 RX ADMIN — SODIUM CHLORIDE, PRESERVATIVE FREE 10 ML: 5 INJECTION INTRAVENOUS at 09:02

## 2020-05-06 RX ADMIN — CARVEDILOL 6.25 MG: 6.25 TABLET, FILM COATED ORAL at 09:00

## 2020-05-06 RX ADMIN — ASPIRIN 81 MG: 81 TABLET, CHEWABLE ORAL at 09:00

## 2020-05-06 RX ADMIN — CARVEDILOL 6.25 MG: 6.25 TABLET, FILM COATED ORAL at 09:42

## 2020-05-06 RX ADMIN — HYDRALAZINE HYDROCHLORIDE 10 MG: 20 INJECTION INTRAMUSCULAR; INTRAVENOUS at 00:06

## 2020-05-06 RX ADMIN — LISINOPRIL 5 MG: 5 TABLET ORAL at 09:00

## 2020-05-06 NOTE — PROGRESS NOTES
Continued Stay Note  [facility]     Patient Name: Lang Huerta  MRN: [id number]  Today's Date: 5/6/2020    Admit Date: 5/4/2020    Discharge Plan     Row Name 05/06/20 1218       Plan    Plan  Home;      Plan Comments  CCP received a call from [facility].  Pt discharge medications totaled $26.24.  Prescription cost billed to CCP department.  Kenneth to deliver medications to Pt at bedside.  KELLI EASTMAN/CCP          Discharge Codes    No documentation.       Expected Discharge Date and Time     Expected Discharge Date Expected Discharge Time    May 6, 2020             Rose Kendall RN

## 2020-05-06 NOTE — NURSING NOTE
Patient discharged home discharge instructions provided to patient using interpretor [id number]. Patient walked down accompanied by RN until his ride arrived.

## 2020-05-06 NOTE — DISCHARGE SUMMARY
CHIEF COMPLAINT    Check rash on both thighs.      HISTORY    Over the last week or so, Edson has developed a rash on the anterior portion of both thighs.  This is right below the area where he has football pads.  Development of rash seems to coincide with the beginning of the football season.    He does have a history of eczema.  He also has a history of a severe reaction to the penicillins.    Needs his EpiPen refilled.    He also has a history of constipation.      Patient Active Problem List   Diagnosis     Need for prophylactic fluoride administration     GERD (gastroesophageal reflux disease)     Constipation     PTSD (post-traumatic stress disorder)       REVIEW OF SYSTEMS    Unremarkable except as above.      EXAM  /54  Pulse 63  Temp 97.4  F (36.3  C) (Oral)  Resp 20  Wt 96 lb (43.5 kg)  SpO2 96%    HEENT unremarkable.  Respiration nonlabored.  Skin exam shows patchy scaly skin eruption without a clear margin located on the anterior portion of both thighs.  Size of this is about 12 x 10 cm on each side.  Extremities and gait normal.      (L30.9) Eczema, unspecified type  (primary encounter diagnosis)  Comment:     Topical steroid prescribed.  We did talk about perhaps one other layer of protection beneath his pads.    Plan: triamcinolone (KENALOG) 0.1 % ointment            (T78.40XS) Allergic reaction, sequela  Comment:   Plan: EPINEPHrine (EPIPEN JR) 0.15 MG/0.3ML injection        2-pack            (K59.01) Slow transit constipation  Comment:   Plan: polyethylene glycol (MIRALAX) powder               [facility] Consult Note    Date of Discharge:  5/6/2020     Date of Admit: 5/4/2020    Discharge Diagnosis:  Unstable angina (CMS/[facility])      Hospital Course:     Mr Huerta is a 64 year old with no known past medical history who presented to the ER with a suspected anterior myocardial infarction.      The patient reported that he had his first episode of pain about 4 weeks ago he describes as pressure associated with diaphoresis and shortness of breath.  The first episode was mild but then he had he had another episode about a week ago that was more severe.  On the night of his presentation on 5/4 he had a severe episode which prompted him to call EMS.  EKG performed in the field showed evidence of mild ST elevation in lead V1, V2, and aVL and ST depression of the inferolateral leads concerning for an anteroseptal myocardial infarction.  He was given sublingual nitroglycerin by EMS with resolution in his symptoms.      Upon arrival to the emergency room he had 2 repeat serial EKGs which have showed normalization of his ST changes.  He remained chest pain free.  He was noted to be markedly hypertensive.  He admitted that he did not go to the doctor on a regular basis and was unaware of his blood pressures prior to his presentations.   He reported until about a month ago he was exercising and running on a regular basis.  Since the COVID-19 pandemic he has not been exercising because he is afraid to go out.  He denies any regular tobacco use.  He denies any known family history of cardiac disease.    Since his symptoms and EKG had resolved I opted to wait and take him to the cath lab the following morning as long as he remained asymptomatic.  He was taken to the cardiac catheterization laboratory on 5/5 which showed 90% stenosis of his proximal LAD and mild non-obstructive disease of his remaining coronary arteries.  He subsequently underwent drug eluting stent placement of the proximal LAD.       Following his stent placement he had no further symptoms.  His main issue was hypertension. By the time of his discharge his blood pressure were better controlled.  The patient will follow up with myself in 7-10 days in the office.  I explained everything to the patient at length with the help of a  by video.       Procedures Performed  Procedure(s):  Left Heart Cath  Coronary angiography  Left ventriculography  Stent CASTRO coronary    FINDINGS:     CORONARY ANGIOGRAPHY:   1.  Left main artery: 0% stenosis.  The vessel bifurcates into left anterior descending and left circumflex arteries.  2.  Left anterior descending artery: Hazy appearing 90% proximal stenosis.  Mid vessel gives rise to a 6 moderate caliber, branching first diagonal branch with no significant disease.  Mid to distal LAD has no significant disease.  Distal LAD gives rise to a smaller caliber second and third diagonal branches with 0% stenosis.  The distal LAD then wraps around the apex supplying the inferoapical wall.  3.  Left circumflex artery: 30% proximal stenosis.  The proximal vessel gives rise to a small caliber first obtuse marginal branch with 0% stenosis.  The mid circumflex gives rise to a large caliber second obtuse marginal branch with 0% stenosis.  Distal continuation circumflex vessel tapers to a moderate caliber and gives rise to a moderate caliber third obtuse marginal branch with 0% stenosis.  4.  Right coronary artery: Moderate caliber vessel with 30 to 40% proximal stenosis.  Mid vessel has scattered 10% stenosis.  The distal vessel has no significant disease and bifurcates into moderate caliber posterior descending and posterior lateral branches both with 0% stenosis.  This is a right dominant system.     LEFT VENTRICULAR HEMODYNAMICS:    1.  Left ventricular pressure: 98/17  2.  Aortic pressure: 90/65     LEFT VENTRICULOGRAPHY:   Normal appearing wall motion.  Left ventricular systolic function appears to  be normal with a visually estimated ejection fraction of 60%.     CORONARY INTERVENTION FINDINGS:  PCI CORONARY SEGMENT:  Proximal LAD  PRE-STENOSIS: 90%  POST-STENOSIS: 0%  LESION TYPE: B1  EVIN FLOW PRE/POST: 3/3  CULPRIT LESION: yes  DISSECTION: none        POST-PROCEDURE DIAGNOSIS:   1.  Stuttering anterior myocardial infarction/non-ST elevation MI status post drug-eluting stent placement of the proximal LAD  2.  Coronary artery disease with residual mild to moderate nonobstructive disease     RECOMMENDATIONS:   Continue dual antiplatelet therapy for at least 1 year.  Aggressive secondary cardiac risk factor management.           Discharge Physical Exam:    General Appearance No acute distress   Neck No adenopathy, supple, trachea midline, no thyromegaly, no carotid bruit, no JVD   Lungs Clear to auscultation,respirations regular, even and unlabored   Heart Regular rhythm and normal rate, normal S1 and S2, no murmur, no gallop, no rub, no click   Chest wall No abnormalities observed   Abdomen Normal bowel sounds, no masses, no hepatomegaly, soft   Extremities Moves all extremities well, no edema, no cyanosis, no redness   Neurological Alert and oriented x 3     Discharge Medications     Discharge Medications      ASK your doctor about these medications      Instructions Start Date   aspirin 81 MG chewable tablet   81 mg, Oral, Daily             Discharge Diet: Healthy Heart     Activity at Discharge: No lifting > 5 pounds for 5 days    Discharge disposition: Home    Condition on Discharge: Stable    Follow-up Appointments  No future appointments.  Additional Instructions for the Follow-ups that You Need to Schedule     Ambulatory Referral to Cardiac Rehab   As directed            Discharge time: 40 minutes       Georgina Ernst RN  05/06/20  10:20

## 2020-05-06 NOTE — PROGRESS NOTES
[facility] Follow Up    Chief Complaint: Follow up CAD    Interval History:  Hypertensive overnight.  Improved with additional 20 mg of lisinopril and 5 mg of amlodipine.  No chest pain or dyspnea.  Ambulating without any issue.     Objective:     Objective:  Temp:  [97.7 °F (36.5 °C)-98.8 °F (37.1 °C)] 98.8 °F (37.1 °C)  Heart Rate:  [60-90] 75  Resp:  [16-18] 18  BP: (122-168)/() 133/87     Intake/Output Summary (Last 24 hours) at 5/6/2020 0757  Last data filed at 5/6/2020 0500  Gross per 24 hour   Intake 1569.2 ml   Output --   Net 1569.2 ml     Body mass index is 31.48 kg/m².      05/04/20 2223 05/05/20  0028   Weight: 95.7 kg (211 lb) 93.9 kg (207 lb 0.2 oz)     Weight change:       Physical Exam:   General : Alert, cooperative, in no acute distress.  Neuro: Alert,cooperative and oriented.  Lungs: CTAB. Normal respiratory effort and rate.  CV: Regular rate and rhythm, normal S1 and S2, no murmurs, gallops or rubs.  ABD: Soft, nontender, nondistended. Positive bowel sounds.  Extr: No edema or cyanosis, moves all extremities.    Lab Review:   Results from last 7 days   Lab Units 05/06/20  0612 05/05/20  0521 05/04/20  2233   SODIUM mmol/L 140 138 140   POTASSIUM mmol/L 3.9 3.6 3.3*   CHLORIDE mmol/L 107 105 105   CO2 mmol/L 21.2* 21.7* 21.4*   BUN mg/dL 14 12 15   CREATININE mg/dL 0.90 0.66* 0.90   GLUCOSE mg/dL 98 133* 152*   CALCIUM mg/dL 8.6 8.5* 8.7   AST (SGOT) U/L  --   --  29   ALT (SGPT) U/L  --   --  28     Results from last 7 days   Lab Units 05/05/20  0203 05/04/20  2233   TROPONIN T ng/mL 0.693* <0.010     Results from last 7 days   Lab Units 05/06/20  0612 05/04/20  2233   WBC 10*3/mm3 9.82 7.56   HEMOGLOBIN g/dL 14.2 14.2   HEMATOCRIT % 40.7 42.5   PLATELETS 10*3/mm3 331 331     Results from last 7 days   Lab Units 05/05/20 0521 05/04/20 2233   INR   --  0.97   APTT seconds 32.9 28.0     Results from last 7 days   Lab Units 05/05/20 0521   MAGNESIUM mg/dL 2.1      Results from last 7 days   Lab Units 05/06/20  0612 05/05/20  0521   CHOLESTEROL mg/dL 157 170   TRIGLYCERIDES mg/dL 80 111   HDL CHOL mg/dL 37* 38*             I reviewed the patient's new clinical results.  I personally viewed and interpreted the patient's EKG  Current Medications:   Scheduled Meds:  aspirin 81 mg Oral Daily   atorvastatin 80 mg Oral Nightly   carvedilol 6.25 mg Oral BID With Meals   clopidogrel 75 mg Oral Daily   lisinopril 5 mg Oral Q24H   sodium chloride 10 mL Intravenous Q12H     Continuous Infusions:     Allergies:  No Known Allergies    Assessment/Plan:     1. Stuttering anterior myocardial infarction/NSTEMI.  Now status post drug eluting stent placement of the proximal LAD.    2. Coronary artery disease  3. Hypertension.  Elevated yesterday but improved overnight.   4. Hyperlipidemia.  Atorvastatin started.    -  Increase carvedilol to 12.5 mg bid  -  Increase lisinopril to 10 mg  -  Discharge home later this morning if BP remains well controlled.    Tita Mcpherson MD  05/06/20  07:57

## 2020-05-13 ENCOUNTER — OFFICE VISIT (OUTPATIENT)
Dept: CARDIOLOGY | Facility: CLINIC | Age: 65
End: 2020-05-13

## 2020-05-13 VITALS
HEART RATE: 65 BPM | WEIGHT: 205 LBS | SYSTOLIC BLOOD PRESSURE: 126 MMHG | BODY MASS INDEX: 31.07 KG/M2 | DIASTOLIC BLOOD PRESSURE: 80 MMHG | HEIGHT: 68 IN

## 2020-05-13 DIAGNOSIS — I10 ESSENTIAL HYPERTENSION: ICD-10-CM

## 2020-05-13 DIAGNOSIS — Z95.5 STATUS POST CORONARY ARTERY STENT PLACEMENT: ICD-10-CM

## 2020-05-13 DIAGNOSIS — E78.5 HYPERLIPIDEMIA, UNSPECIFIED HYPERLIPIDEMIA TYPE: ICD-10-CM

## 2020-05-13 DIAGNOSIS — I25.10 CORONARY ARTERY DISEASE INVOLVING NATIVE CORONARY ARTERY OF NATIVE HEART WITHOUT ANGINA PECTORIS: Primary | ICD-10-CM

## 2020-05-13 PROBLEM — I20.0 UNSTABLE ANGINA (HCC): Status: RESOLVED | Noted: 2020-05-04 | Resolved: 2020-05-13

## 2020-05-13 PROCEDURE — 93000 ELECTROCARDIOGRAM COMPLETE: CPT | Performed by: INTERNAL MEDICINE

## 2020-05-13 PROCEDURE — 99214 OFFICE O/P EST MOD 30 MIN: CPT | Performed by: INTERNAL MEDICINE

## 2020-05-13 RX ORDER — CARVEDILOL 12.5 MG/1
12.5 TABLET ORAL 2 TIMES DAILY WITH MEALS
Qty: 30 TABLET | Refills: 5 | Status: SHIPPED | OUTPATIENT
Start: 2020-05-13 | End: 2020-05-13

## 2020-05-13 RX ORDER — CLOPIDOGREL BISULFATE 75 MG/1
75 TABLET ORAL DAILY
Qty: 30 TABLET | Refills: 11 | Status: SHIPPED | OUTPATIENT
Start: 2020-05-13 | End: 2020-05-13

## 2020-05-13 RX ORDER — LISINOPRIL 10 MG/1
10 TABLET ORAL
Qty: 30 TABLET | Refills: 5 | Status: SHIPPED | OUTPATIENT
Start: 2020-05-13

## 2020-05-13 RX ORDER — LISINOPRIL 10 MG/1
10 TABLET ORAL
Qty: 30 TABLET | Refills: 5 | Status: SHIPPED | OUTPATIENT
Start: 2020-05-13 | End: 2020-05-13

## 2020-05-13 RX ORDER — CARVEDILOL 12.5 MG/1
12.5 TABLET ORAL 2 TIMES DAILY WITH MEALS
Qty: 30 TABLET | Refills: 5 | Status: SHIPPED | OUTPATIENT
Start: 2020-05-13

## 2020-05-13 RX ORDER — ATORVASTATIN CALCIUM 80 MG/1
80 TABLET, FILM COATED ORAL NIGHTLY
Qty: 30 TABLET | Refills: 5 | Status: SHIPPED | OUTPATIENT
Start: 2020-05-13 | End: 2020-05-13

## 2020-05-13 RX ORDER — CLOPIDOGREL BISULFATE 75 MG/1
75 TABLET ORAL DAILY
Qty: 30 TABLET | Refills: 11 | Status: SHIPPED | OUTPATIENT
Start: 2020-05-13

## 2020-05-13 RX ORDER — ATORVASTATIN CALCIUM 80 MG/1
80 TABLET, FILM COATED ORAL NIGHTLY
Qty: 30 TABLET | Refills: 5 | Status: SHIPPED | OUTPATIENT
Start: 2020-05-13

## 2020-05-13 NOTE — PROGRESS NOTES
Subjective:     Encounter Date:05/13/2020      Patient ID: Lang Huerta is a 64 y.o. male.    Chief Complaint:  History of Present Illness    This is a 64-year-old with a history of hypertension, coronary artery disease status post drug-eluting stent placement of his proximal LAD for stuttering anterior myocardial infarction and non-ST elevation MI, dyslipidemia, who presents for hospital follow-up.    I saw the patient initially when he was admitted to the hospital on 5/4/2020 with complaints of chest pain.  He reported that he began having episodes of pain about a month prior there were associated with diaphoresis and shortness of breath.  His initial episode was mild.  He then had an episode about a week prior to his admission that was more severe but resolved on its own.  On the night of his presentation he had a severe episode that persisted so he called EMS.  EKG in the field showed evidence of mild anterior ST elevations with lateral ST depressions consistent with an anterior lateral myocardial infarction.  He was given sublingual nitroglycerin by EMS with complete resolution of his symptoms by the time he arrived to the emergency room.  Following his arrival to the emergency room he had to repeat EKGs that showed normalization of his ST changes.  He also remained chest pain-free.  He was noted to be hypertensive with systolic pressures in the 200s and diastolics in the 100s.  He admitted at that time that he never went to the doctor and was unaware of any high blood pressures.  He used to exercise on a regular basis but had not done so since the COVID-19 pandemic started about a month prior.    Since his symptoms and EKG changes had resolved I opted to wait to proceed with cardiac catheterization until the final morning.  In the meanwhile he was started on a nitroglycerin and heparin drip.  The following morning he only reported mild discomfort and he was ultimately taken to the cardiac  catheterization laboratory in 5/5/2020.  His coronary angiograms reveal a 90% stenosis of his proximal LAD and mild nonobstructive disease of his remaining coronary arteries.  He subsequently underwent drug-eluting stent placement of his proximal LAD.  He had no further symptoms after that.  Continue to adjust his medications for blood pressure control.  He was finally discharged on 5/6/2020 in stable condition.    Today presents for one-week follow-up.  He is primarily Malay-speaking only so the visit was performed with the aid of video  by iPad.  He feels well.  He is reports compliance with his medications.  He denies any chest pain, shortness of breath, palpitations, orthopnea, near-syncope or syncope, lower extremity edema, or palpitations.  He has not really been doing much activity since he returned home.  He feels ready to go back to work.  He reports that his job does not require any significant physical exertion.    Review of Systems   Constitution: Negative for malaise/fatigue.   HENT: Negative for hearing loss, hoarse voice, nosebleeds and sore throat.    Eyes: Negative for pain.   Cardiovascular: Negative for chest pain, claudication, cyanosis, dyspnea on exertion, irregular heartbeat, leg swelling, near-syncope, orthopnea, palpitations, paroxysmal nocturnal dyspnea and syncope.   Respiratory: Negative for shortness of breath and snoring.    Endocrine: Negative for cold intolerance, heat intolerance, polydipsia, polyphagia and polyuria.   Skin: Negative for itching and rash.   Musculoskeletal: Negative for arthritis, falls, joint pain, joint swelling, muscle cramps, muscle weakness and myalgias.   Gastrointestinal: Negative for constipation, diarrhea, dysphagia, heartburn, hematemesis, hematochezia, melena, nausea and vomiting.   Genitourinary: Negative for frequency, hematuria and hesitancy.   Neurological: Negative for excessive daytime sleepiness, dizziness, headaches, light-headedness,  numbness and weakness.   Psychiatric/Behavioral: Negative for depression. The patient is not nervous/anxious.          Current Outpatient Medications:   •  aspirin 81 MG chewable tablet, Chew 81 mg Daily., Disp: , Rfl:   •  atorvastatin (LIPITOR) 80 MG tablet, Take 1 tablet by mouth Every Night., Disp: 30 tablet, Rfl: 5  •  carvedilol (COREG) 12.5 MG tablet, Take 1 tablet by mouth 2 (Two) Times a Day With Meals., Disp: 30 tablet, Rfl: 5  •  clopidogrel (PLAVIX) 75 MG tablet, Take 1 tablet by mouth Daily., Disp: 30 tablet, Rfl: 11  •  lisinopril (PRINIVIL,ZESTRIL) 10 MG tablet, Take 1 tablet by mouth Daily., Disp: 30 tablet, Rfl: 5    Past Medical History:   Diagnosis Date   • Hypertension    • Unstable angina (CMS/HCC)        Past Surgical History:   Procedure Laterality Date   • CARDIAC CATHETERIZATION N/A 5/5/2020    Procedure: Left Heart Cath;  Surgeon: Tita Mcpherson MD;  Location: [facility] CATH INVASIVE LOCATION;  Service: Cardiology;  Laterality: N/A;   • CARDIAC CATHETERIZATION N/A 5/5/2020    Procedure: Coronary angiography;  Surgeon: Tita Mcpherson MD;  Location: [facility] CATH INVASIVE LOCATION;  Service: Cardiology;  Laterality: N/A;   • CARDIAC CATHETERIZATION N/A 5/5/2020    Procedure: Left ventriculography;  Surgeon: Tita Mcpherson MD;  Location: [facility] CATH INVASIVE LOCATION;  Service: Cardiology;  Laterality: N/A;   • CARDIAC CATHETERIZATION N/A 5/5/2020    Procedure: Stent CASTRO coronary;  Surgeon: Tita Mcpherson MD;  Location: [facility] CATH INVASIVE LOCATION;  Service: Cardiology;  Laterality: N/A;   • THYROID SURGERY         History reviewed. No pertinent family history.    Social History     Tobacco Use   • Smoking status: Current Some Day Smoker   • Smokeless tobacco: Never Used   Substance Use Topics   • Alcohol use: Never     Frequency: Never   • Drug use: Never         ECG 12 Lead  Date/Time: 5/13/2020 4:25 PM  Performed by: Tita Mcpherson MD  Authorized by: Tita Mcpherson MD   Comparison:  "compared with previous ECG   Similar to previous ECG  Rhythm: sinus rhythm               Objective:     Visit Vitals  /80 (BP Location: Left arm, Patient Position: Sitting)   Pulse 65   Ht 172.7 cm (68\")   Wt 93 kg (205 lb)   BMI 31.17 kg/m²         Physical Exam   Constitutional: He is oriented to person, place, and time. He appears well-developed and well-nourished.   HENT:   Head: Normocephalic and atraumatic.   Neck: No JVD present. Carotid bruit is not present.   Cardiovascular: Normal rate, regular rhythm, S1 normal and S2 normal. Exam reveals no gallop.   No murmur heard.  Pulses:       Radial pulses are 2+ on the right side, and 2+ on the left side.   Pulmonary/Chest: Effort normal and breath sounds normal.   Abdominal: Soft. Normal appearance.   Musculoskeletal: He exhibits no edema.   Neurological: He is alert and oriented to person, place, and time.   Skin: Skin is warm, dry and intact.   Psychiatric: He has a normal mood and affect.             Assessment:          Diagnosis Plan   1. Coronary artery disease involving native coronary artery of native heart without angina pectoris     2. Essential hypertension     3. Hyperlipidemia, unspecified hyperlipidemia type     4. Status post coronary artery stent placement            Plan:       1.  Coronary artery disease.  He is status post drug-eluting stent placement of his proximal LAD.  He is doing well from the standpoint.  I went over his coronary angiograms, the findings, and the final results after the stent placement.  I emphasized the importance of remaining compliant with his medications including clopidogrel.  He received all his medications from the hospital pharmacy.  I sent new prescriptions to a outside pharmacy and additionally gave him written prescriptions in case he had issues obtaining medications from the outside pharmacy.  Since it cardiac rehab remains closed I asked that the patient start increasing his activity slowly.  I " recommended that he start with walking and start working himself back up to running.  I have also gone ahead and cleared him to return to work next week.  I gave him a note stating this.  2.  Hypertension.  Well-controlled on his current medications.  3.  Hyperlipidemia.  On high-dose atorvastatin.    We will plan on seeing the patient back again in 3 months.

## 2023-12-06 ENCOUNTER — HOSPITAL ENCOUNTER (EMERGENCY)
Facility: HOSPITAL | Age: 68
Discharge: HOME | End: 2023-12-06
Attending: STUDENT IN AN ORGANIZED HEALTH CARE EDUCATION/TRAINING PROGRAM
Payer: COMMERCIAL

## 2023-12-06 VITALS
WEIGHT: 200 LBS | OXYGEN SATURATION: 98 % | TEMPERATURE: 97.6 F | HEART RATE: 70 BPM | BODY MASS INDEX: 31.39 KG/M2 | RESPIRATION RATE: 18 BRPM | SYSTOLIC BLOOD PRESSURE: 177 MMHG | HEIGHT: 67 IN | DIASTOLIC BLOOD PRESSURE: 101 MMHG

## 2023-12-06 DIAGNOSIS — I10 HYPERTENSION, UNSPECIFIED TYPE: Primary | ICD-10-CM

## 2023-12-06 LAB
ALBUMIN SERPL-MCNC: 4 G/DL (ref 3.5–5.2)
ALBUMIN/GLOB SERPL: 1.3 G/DL
ALP SERPL-CCNC: 81 U/L (ref 39–117)
ALT SERPL W P-5'-P-CCNC: 19 U/L (ref 1–41)
ANION GAP SERPL CALCULATED.3IONS-SCNC: 10 MMOL/L (ref 5–15)
AST SERPL-CCNC: 17 U/L (ref 1–40)
BACTERIA UR QL AUTO: ABNORMAL /HPF
BASOPHILS # BLD AUTO: 0.04 10*3/MM3 (ref 0–0.2)
BASOPHILS NFR BLD AUTO: 0.5 % (ref 0–1.5)
BILIRUB SERPL-MCNC: 0.3 MG/DL (ref 0–1.2)
BILIRUB UR QL STRIP: NEGATIVE
BUN SERPL-MCNC: 15 MG/DL (ref 8–23)
BUN/CREAT SERPL: 16 (ref 7–25)
CALCIUM SPEC-SCNC: 9 MG/DL (ref 8.6–10.5)
CHLORIDE SERPL-SCNC: 108 MMOL/L (ref 98–107)
CLARITY UR: CLEAR
CO2 SERPL-SCNC: 21 MMOL/L (ref 22–29)
COLOR UR: YELLOW
CREAT SERPL-MCNC: 0.94 MG/DL (ref 0.76–1.27)
DEPRECATED RDW RBC AUTO: 43.5 FL (ref 37–54)
EGFRCR SERPLBLD CKD-EPI 2021: 87.8 ML/MIN/1.73
EOSINOPHIL # BLD AUTO: 0.18 10*3/MM3 (ref 0–0.4)
EOSINOPHIL NFR BLD AUTO: 2.3 % (ref 0.3–6.2)
ERYTHROCYTE [DISTWIDTH] IN BLOOD BY AUTOMATED COUNT: 13 % (ref 12.3–15.4)
GLOBULIN UR ELPH-MCNC: 3.1 GM/DL
GLUCOSE SERPL-MCNC: 92 MG/DL (ref 65–99)
GLUCOSE UR STRIP-MCNC: NEGATIVE MG/DL
HCT VFR BLD AUTO: 45.1 % (ref 37.5–51)
HGB BLD-MCNC: 15.2 G/DL (ref 13–17.7)
HGB UR QL STRIP.AUTO: ABNORMAL
HYALINE CASTS UR QL AUTO: ABNORMAL /LPF
IMM GRANULOCYTES # BLD AUTO: 0.02 10*3/MM3 (ref 0–0.05)
IMM GRANULOCYTES NFR BLD AUTO: 0.3 % (ref 0–0.5)
KETONES UR QL STRIP: NEGATIVE
LEUKOCYTE ESTERASE UR QL STRIP.AUTO: NEGATIVE
LYMPHOCYTES # BLD AUTO: 1.72 10*3/MM3 (ref 0.7–3.1)
LYMPHOCYTES NFR BLD AUTO: 21.7 % (ref 19.6–45.3)
MCH RBC QN AUTO: 31.2 PG (ref 26.6–33)
MCHC RBC AUTO-ENTMCNC: 33.7 G/DL (ref 31.5–35.7)
MCV RBC AUTO: 92.6 FL (ref 79–97)
MONOCYTES # BLD AUTO: 0.64 10*3/MM3 (ref 0.1–0.9)
MONOCYTES NFR BLD AUTO: 8.1 % (ref 5–12)
NEUTROPHILS NFR BLD AUTO: 5.34 10*3/MM3 (ref 1.7–7)
NEUTROPHILS NFR BLD AUTO: 67.1 % (ref 42.7–76)
NITRITE UR QL STRIP: NEGATIVE
NRBC BLD AUTO-RTO: 0 /100 WBC (ref 0–0.2)
PH UR STRIP.AUTO: 6 [PH] (ref 5–8)
PLATELET # BLD AUTO: 281 10*3/MM3 (ref 140–450)
PMV BLD AUTO: 8.8 FL (ref 6–12)
POTASSIUM SERPL-SCNC: 4.1 MMOL/L (ref 3.5–5.2)
PROT SERPL-MCNC: 7.1 G/DL (ref 6–8.5)
PROT UR QL STRIP: NEGATIVE
QT INTERVAL: 401 MS
QTC INTERVAL: 427 MS
RBC # BLD AUTO: 4.87 10*6/MM3 (ref 4.14–5.8)
RBC # UR STRIP: ABNORMAL /HPF
REF LAB TEST METHOD: ABNORMAL
SODIUM SERPL-SCNC: 139 MMOL/L (ref 136–145)
SP GR UR STRIP: 1.02 (ref 1–1.03)
SQUAMOUS #/AREA URNS HPF: ABNORMAL /HPF
TROPONIN T SERPL HS-MCNC: 7 NG/L
UROBILINOGEN UR QL STRIP: ABNORMAL
WBC # UR STRIP: ABNORMAL /HPF
WBC NRBC COR # BLD AUTO: 7.94 10*3/MM3 (ref 3.4–10.8)

## 2023-12-06 PROCEDURE — 93005 ELECTROCARDIOGRAM TRACING: CPT | Performed by: STUDENT IN AN ORGANIZED HEALTH CARE EDUCATION/TRAINING PROGRAM

## 2023-12-06 PROCEDURE — 80053 COMPREHEN METABOLIC PANEL: CPT | Performed by: STUDENT IN AN ORGANIZED HEALTH CARE EDUCATION/TRAINING PROGRAM

## 2023-12-06 PROCEDURE — 99283 EMERGENCY DEPT VISIT LOW MDM: CPT

## 2023-12-06 PROCEDURE — 84484 ASSAY OF TROPONIN QUANT: CPT | Performed by: STUDENT IN AN ORGANIZED HEALTH CARE EDUCATION/TRAINING PROGRAM

## 2023-12-06 PROCEDURE — 85025 COMPLETE CBC W/AUTO DIFF WBC: CPT | Performed by: STUDENT IN AN ORGANIZED HEALTH CARE EDUCATION/TRAINING PROGRAM

## 2023-12-06 PROCEDURE — 36415 COLL VENOUS BLD VENIPUNCTURE: CPT

## 2023-12-06 PROCEDURE — 81001 URINALYSIS AUTO W/SCOPE: CPT | Performed by: STUDENT IN AN ORGANIZED HEALTH CARE EDUCATION/TRAINING PROGRAM

## 2023-12-06 NOTE — ED PROVIDER NOTES
EMERGENCY DEPARTMENT ENCOUNTER    Room Number:  15/15  PCP: Masood Jensen MD  Historian: Patient      HPI:  Chief Complaint: High blood pressure increased urinary frequency  Context: Lang Huerta is a 68 y.o. male who presents to the ED c/o high blood pressure and increased urinary frequency.  Patient is not on any medication for high blood pressure and states that his blood pressure is usually not this high.  Patient checked it this morning and noted to be over 170 systolic.  Patient denies chest pain, lightheadedness, shortness of breath.  Patient did have a brief episode of dizziness earlier today but that is resolved.  Patient is also concerned about increased urinary frequency.  But denies dysuria, fevers, chills            PAST MEDICAL HISTORY  Active Ambulatory Problems     Diagnosis Date Noted    Coronary artery disease involving native coronary artery of native heart without angina pectoris 05/13/2020    Essential hypertension 05/13/2020    Hyperlipidemia 05/13/2020    Status post coronary artery stent placement 05/13/2020     Resolved Ambulatory Problems     Diagnosis Date Noted    Unstable angina 05/04/2020     Past Medical History:   Diagnosis Date    Hypertension          PAST SURGICAL HISTORY  Past Surgical History:   Procedure Laterality Date    CARDIAC CATHETERIZATION N/A 5/5/2020    Procedure: Left Heart Cath;  Surgeon: Tita Mcpherson MD;  Location: [facility] CATH INVASIVE LOCATION;  Service: Cardiology;  Laterality: N/A;    CARDIAC CATHETERIZATION N/A 5/5/2020    Procedure: Coronary angiography;  Surgeon: Tita Mcpherson MD;  [facility];  Service: Cardiology;  Laterality: N/A;    CARDIAC CATHETERIZATION N/A 5/5/2020    Procedure: Left ventriculography;  Surgeon: Tita Mcpherson MD;  Location:  [facility] CATH INVASIVE LOCATION;  Service: Cardiology;  Laterality: N/A;    CARDIAC CATHETERIZATION N/A 5/5/2020    Procedure: Stent CASTRO coronary;  Surgeon: Ky  MD Tita;  Location: [facility] INVASIVE LOCATION;  Service: Cardiology;  Laterality: N/A;    THYROID SURGERY           FAMILY HISTORY  History reviewed. No pertinent family history.      SOCIAL HISTORY  Social History     Socioeconomic History    Marital status: Single   Tobacco Use    Smoking status: Some Days    Smokeless tobacco: Never   Substance and Sexual Activity    Alcohol use: Never    Drug use: Never         ALLERGIES  Patient has no known allergies.        REVIEW OF SYSTEMS  Review of Systems   Genitourinary:  Positive for frequency.   All other systems reviewed and are negative.       PHYSICAL EXAM  ED Triage Vitals   Temp Heart Rate Resp BP SpO2   12/06/23 1218 12/06/23 1218 12/06/23 1218 12/06/23 1221 12/06/23 1218   97.6 °F (36.4 °C) 74 18 (!) 167/118 98 %      Temp src Heart Rate Source Patient Position BP Location FiO2 (%)   -- -- 12/06/23 1221 12/06/23 1221 --     Sitting Right arm        Physical Exam      GENERAL: no acute distress  HENT: nares patent  EYES: no scleral icterus  CV: regular rhythm, normal rate  RESPIRATORY: normal effort  ABDOMEN: soft  MUSCULOSKELETAL: no deformity  NEURO: alert, moves all extremities, follows commands  PSYCH:  calm, cooperative  SKIN: warm, dry    Vital signs and nursing notes reviewed.          LAB RESULTS  Recent Results (from the past 24 hour(s))   ECG 12 Lead Other; dizziness    Collection Time: 12/06/23 12:57 PM   Result Value Ref Range    QT Interval 401 ms    QTC Interval 427 ms   Comprehensive Metabolic Panel    Collection Time: 12/06/23  1:01 PM    Specimen: Arm, Right; Blood   Result Value Ref Range    Glucose 92 65 - 99 mg/dL    BUN 15 8 - 23 mg/dL    Creatinine 0.94 0.76 - 1.27 mg/dL    Sodium 139 136 - 145 mmol/L    Potassium 4.1 3.5 - 5.2 mmol/L    Chloride 108 (H) 98 - 107 mmol/L    CO2 21.0 (L) 22.0 - 29.0 mmol/L    Calcium 9.0 8.6 - 10.5 mg/dL    Total Protein 7.1 6.0 - 8.5 g/dL    Albumin 4.0 3.5 - 5.2 g/dL    ALT (SGPT) 19 1 - 41 U/L     AST (SGOT) 17 1 - 40 U/L    Alkaline Phosphatase 81 39 - 117 U/L    Total Bilirubin 0.3 0.0 - 1.2 mg/dL    Globulin 3.1 gm/dL    A/G Ratio 1.3 g/dL    BUN/Creatinine Ratio 16.0 7.0 - 25.0    Anion Gap 10.0 5.0 - 15.0 mmol/L    eGFR 87.8 >60.0 mL/min/1.73   Single High Sensitivity Troponin T    Collection Time: 12/06/23  1:01 PM    Specimen: Arm, Right; Blood   Result Value Ref Range    HS Troponin T 7 <22 ng/L   CBC Auto Differential    Collection Time: 12/06/23  1:01 PM    Specimen: Arm, Right; Blood   Result Value Ref Range    WBC 7.94 3.40 - 10.80 10*3/mm3    RBC 4.87 4.14 - 5.80 10*6/mm3    Hemoglobin 15.2 13.0 - 17.7 g/dL    Hematocrit 45.1 37.5 - 51.0 %    MCV 92.6 79.0 - 97.0 fL    MCH 31.2 26.6 - 33.0 pg    MCHC 33.7 31.5 - 35.7 g/dL    RDW 13.0 12.3 - 15.4 %    RDW-SD 43.5 37.0 - 54.0 fl    MPV 8.8 6.0 - 12.0 fL    Platelets 281 140 - 450 10*3/mm3    Neutrophil % 67.1 42.7 - 76.0 %    Lymphocyte % 21.7 19.6 - 45.3 %    Monocyte % 8.1 5.0 - 12.0 %    Eosinophil % 2.3 0.3 - 6.2 %    Basophil % 0.5 0.0 - 1.5 %    Immature Grans % 0.3 0.0 - 0.5 %    Neutrophils, Absolute 5.34 1.70 - 7.00 10*3/mm3    Lymphocytes, Absolute 1.72 0.70 - 3.10 10*3/mm3    Monocytes, Absolute 0.64 0.10 - 0.90 10*3/mm3    Eosinophils, Absolute 0.18 0.00 - 0.40 10*3/mm3    Basophils, Absolute 0.04 0.00 - 0.20 10*3/mm3    Immature Grans, Absolute 0.02 0.00 - 0.05 10*3/mm3    nRBC 0.0 0.0 - 0.2 /100 WBC   Urinalysis With Microscopic If Indicated (No Culture) - Urine, Clean Catch    Collection Time: 12/06/23  2:56 PM    Specimen: Urine, Clean Catch   Result Value Ref Range    Color, UA Yellow Yellow, Straw    Appearance, UA Clear Clear    pH, UA 6.0 5.0 - 8.0    Specific Gravity, UA 1.021 1.005 - 1.030    Glucose, UA Negative Negative    Ketones, UA Negative Negative    Bilirubin, UA Negative Negative    Blood, UA Small (1+) (A) Negative    Protein, UA Negative Negative    Leuk Esterase, UA Negative Negative    Nitrite, UA Negative  Negative    Urobilinogen, UA 0.2 E.U./dL 0.2 - 1.0 E.U./dL   Urinalysis, Microscopic Only - Urine, Clean Catch    Collection Time: 12/06/23  2:56 PM    Specimen: Urine, Clean Catch   Result Value Ref Range    RBC, UA 6-10 (A) None Seen, 0-2 /HPF    WBC, UA 0-2 None Seen, 0-2 /HPF    Bacteria, UA None Seen None Seen /HPF    Squamous Epithelial Cells, UA 0-2 None Seen, 0-2 /HPF    Hyaline Casts, UA 0-2 None Seen /LPF    Methodology Automated Microscopy        Ordered the above labs and reviewed the results.        RADIOLOGY  No Radiology Exams Resulted Within Past 24 Hours    Ordered the above noted radiological studies. Reviewed by me in PACS.            MEDICATIONS GIVEN IN ER  Medications - No data to display                MEDICAL DECISION MAKING, PROGRESS, and CONSULTS    All labs have been independently reviewed by me.  All radiology studies have been reviewed by me and I have also reviewed the radiology report.   EKG's independently viewed and interpreted by me.  Discussion below represents my analysis of pertinent findings related to patient's condition, differential diagnosis, treatment plan and final disposition.      Additional sources:  - Discussed/ obtained information from independent historians: Family member at bedside    - Chronic or social conditions impacting care: Coronary artery disease, hypertension    - Shared decision making: Lysing shared decision-making techniques we elected to proceed with outpatient management at this time      Orders placed during this visit:  Orders Placed This Encounter   Procedures    Comprehensive Metabolic Panel    Single High Sensitivity Troponin T    Urinalysis With Microscopic If Indicated (No Culture) - Urine, Clean Catch    CBC Auto Differential    Urinalysis, Microscopic Only - Urine, Clean Catch    ECG 12 Lead Other; dizziness    CBC & Differential         Differential diagnosis includes but is not limited to:    Hypertension, hypertensive emergency,  UTI      Independent interpretation of labs, radiology studies, and discussions with consultants:  ED Course as of 12/06/23 1542   Wed Dec 06, 2023   1333 EKG interpreted by me demonstrates sinus rhythm, rate 68, no AL/QT prolongation, no ST elevation [MW]   1542 Evaluation in the emergency department is overall unremarkable.  Patient is noted to be hypertensive but is currently asymptomatic.  Will forego treatment at this time and defer to primary care for more accurate measurements of blood pressure and appropriate follow-up.  Supportive care measures and return precautions discussed and patient discharged in stable condition. [MW]      ED Course User Index  [MW] Fidencio Murphy MD       DIAGNOSIS  Final diagnoses:   Hypertension, unspecified type         DISPOSITION  DISCHARGE    Patient discharged in stable condition.    Reviewed implications of results, diagnosis, meds, responsibility to follow up, warning signs and symptoms of possible worsening, potential complications and reasons to return to ER, including chest pain, shortness of breath, lightheadedness.    Patient/Family voiced understanding of above instructions.    Discussed plan for discharge, as there is no emergent indication for admission. Patient referred to primary care provider for BP management due to today's BP. Pt/family is agreeable and understands need for follow up and repeat testing.  Pt is aware that discharge does not mean that nothing is wrong but it indicates no emergency is present that requires admission and they must continue care with follow-up as given below or physician of their choice.     FOLLOW-UP  Masood Jensen MD  [address]  [address]  [place] [id number] [phone]    In 1 week           Medication List      No changes were made to your prescriptions during this visit.                   Latest Documented Vital Signs:  As of 15:42 EST  BP- (!) 164/115 HR- 62 Temp- 97.6 °F (36.4 °C) O2 sat- 98%               --    Please note that portions of this were completed with a voice recognition program.       Note Disclaimer: At [facility], we believe that sharing information builds trust and better relationships. You are receiving this note because you are receiving care at [facility] or recently visited. It is possible you will see health information before a provider has talked with you about it. This kind of information can be easy to misunderstand. To help you fully understand what it means for your health, we urge you to discuss this note with your provider.             Fidencio Murphy MD  12/06/23 1541

## 2023-12-06 NOTE — Clinical Note
[facility] EMERGENCY DEPARTMENT  [address] [address]  Phone: [phone]    Lang Huerta was seen and treated in our emergency department on 12/6/2023.  He may return to work on 12/08/2023.         Thank you for choosing [facility].    Fidencio Murphy MD

## 2023-12-06 NOTE — Clinical Note
[facility] EMERGENCY DEPARTMENT  [address] [facility] [address]  Phone: [phone]    Lang Huerta was seen and treated in our emergency department on 12/6/2023.  He may return to work on 12/08/2023.         Thank you for choosing [facility].    Fidencio Murphy MD

## 2023-12-06 NOTE — ED NOTES
Pt to ED for systolic BP of 150. Pt states he was a little dizzy this morning but denies any dizziness right now.  Pt c/o that he is urinating a lot.

## 2024-03-07 NOTE — DISCHARGE INSTRUCTIONS
Routine appointments for health maintenance with a primary care provider are very important and emergency department visits are no substitute.  You should review all findings and test results from your visit today with your primary care physician.        We recommended that you take medications as prescribed.        Return to the emergency department for any new or concerning signs/symptoms or failure to improve.   Please follow-up with your primary care physician within 1 week for repeat evaluation and evaluation of high blood pressure    Please return to the emergency department new or worsening symptoms including but not limited to chest pain, shortness of breath, lightheadedness, dizziness

## (undated) DEVICE — 6F .070 XB LAD 3.5 100CM: Brand: VISTA BRITE TIP

## (undated) DEVICE — GLIDESHEATH SLENDER STAINLESS STEEL KIT: Brand: GLIDESHEATH SLENDER

## (undated) DEVICE — NC TREK CORONARY DILATATION CATHETER 3.5 MM X 15 MM / RAPID-EXCHANGE: Brand: NC TREK

## (undated) DEVICE — BND PRESS RADL COMFRT 14IN STRL

## (undated) DEVICE — CATH VENT MIV RADL PIG ST TIP 5F 110CM

## (undated) DEVICE — PK CATH CARD 40

## (undated) DEVICE — CATH DIAG IMPULSE FL3.5 5F 100CM

## (undated) DEVICE — HI-TORQUE BALANCE MIDDLEWEIGHT GUIDE WIRE .014 STRAIGHT TIP 3.0 CM X 190 CM: Brand: HI-TORQUE BALANCE MIDDLEWEIGHT

## (undated) DEVICE — NC TREK CORONARY DILATATION CATHETER 4.0 MM X 15 MM / RAPID-EXCHANGE: Brand: NC TREK

## (undated) DEVICE — DEV INDEFLATOR

## (undated) DEVICE — GW EMR FIX EXCHG J STD .035 3MM 260CM

## (undated) DEVICE — CATH DIAG IMPULSE FR4 5F 100CM

## (undated) DEVICE — KT MANIFLD CARDIAC